# Patient Record
Sex: FEMALE
[De-identification: names, ages, dates, MRNs, and addresses within clinical notes are randomized per-mention and may not be internally consistent; named-entity substitution may affect disease eponyms.]

---

## 2020-06-18 PROBLEM — Z00.00 ENCOUNTER FOR PREVENTIVE HEALTH EXAMINATION: Status: ACTIVE | Noted: 2020-06-18

## 2020-06-18 PROBLEM — Z87.2 HISTORY OF PSORIASIS: Status: RESOLVED | Noted: 2020-06-18 | Resolved: 2020-06-18

## 2020-06-18 PROBLEM — Z78.9 RARELY CONSUMES ALCOHOL: Status: ACTIVE | Noted: 2020-06-18

## 2020-06-18 RX ORDER — ROSUVASTATIN CALCIUM 5 MG/1
TABLET, FILM COATED ORAL
Refills: 0 | Status: ACTIVE | COMMUNITY

## 2020-06-18 RX ORDER — LORAZEPAM 2 MG/1
TABLET ORAL
Refills: 0 | Status: ACTIVE | COMMUNITY

## 2020-06-18 RX ORDER — ZOLPIDEM TARTRATE 5 MG/1
TABLET, FILM COATED ORAL
Refills: 0 | Status: ACTIVE | COMMUNITY

## 2020-06-19 ENCOUNTER — APPOINTMENT (OUTPATIENT)
Dept: NEUROSURGERY | Facility: CLINIC | Age: 45
End: 2020-06-19
Payer: COMMERCIAL

## 2020-06-19 DIAGNOSIS — D33.2 BENIGN NEOPLASM OF BRAIN, UNSPECIFIED: ICD-10-CM

## 2020-06-19 DIAGNOSIS — Z78.9 OTHER SPECIFIED HEALTH STATUS: ICD-10-CM

## 2020-06-19 DIAGNOSIS — Z87.2 PERSONAL HISTORY OF DISEASES OF THE SKIN AND SUBCUTANEOUS TISSUE: ICD-10-CM

## 2020-06-19 PROCEDURE — 99204 OFFICE O/P NEW MOD 45 MIN: CPT | Mod: 95

## 2020-06-19 NOTE — HISTORY OF PRESENT ILLNESS
[FreeTextEntry1] : headache [de-identified] : 45 year old female presented to neuro-optho Dr. Tl Mcdonald for complaints of headaches, lightheadedness when standing and a "whooshing" sound in both ears. This has been going on for about a year intermittently.\par She  denies abnormal growth of hands/feet, breast leaking, abnormal growth of facial features, significant unexplained weight loss/gain, sudden or peripheral vision loss and abnormal hair growth/loss.\par

## 2020-06-19 NOTE — ASSESSMENT
[FreeTextEntry1] : My impression is that the patient suffers from intracranial hypertension .   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  Her KPS is 100. I had a long discussion with the patient regarding the role of ETV.  The patient was extensively educated about the nature of her disease process. We will review her case at our tumor board on Monday. . I have explained the alternatives, risks and benefits to the patient and she understands and agrees to proceed.  This risk of the procedure including but not limited to pain, infection, seizure, stroke, recurrence, residual disease, neurovascular injury, heart attack, pulmonary embolism, blindness, weakness, paralysis and death have been carefully explained to the patient who clearly understands and agrees to proceed.\par

## 2020-06-19 NOTE — DATA REVIEWED
[de-identified] : I have reviewed the most recent MRI 6/12/20 which shows occlusion of the cerebral aqueduct. Ill defined T2 signal change surrounding aqueduct. Mild expansion of lower tectum. Differntial inclused low grade glioma, subependymoma, aqueductal stenosis with interstitial edema. 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 Evidence of bilateral papilledema and low lying tonsils consistent with intracranial hypertension

## 2020-06-19 NOTE — REASON FOR VISIT
[New Patient Visit] : a new patient visit [Referred By: _________] : Patient was referred by SAEID [Family Member] : family member [FreeTextEntry1] : tectal lesion

## 2020-06-22 ENCOUNTER — APPOINTMENT (OUTPATIENT)
Dept: NEUROSURGERY | Facility: CLINIC | Age: 45
End: 2020-06-22
Payer: COMMERCIAL

## 2020-06-22 PROCEDURE — 99214 OFFICE O/P EST MOD 30 MIN: CPT

## 2020-06-22 NOTE — DATA REVIEWED
[de-identified] : I have reviewed the most recent MRI 6/12/20 which shows occlusion of the cerebral aqueduct. Ill defined T2 signal change surrounding aqueduct. Mild expansion of lower tectum. Differntial inclused low grade glioma, subependymoma, aqueductal stenosis with interstitial edema. 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 Evidence of bilateral papilledema and low lying tonsils consistent with intracranial hypertension

## 2020-06-22 NOTE — ASSESSMENT
[FreeTextEntry1] : My impression is that the patient suffers from intracranial hypertension .   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  Her KPS is 100. I had a long discussion with the patient regarding the role of ETV vs  shunt. The consensus from tumor board was to attempt ETV but have a low threshold for  shunt given her basilar anatomy.  The patient was extensively educated about the nature of her disease process.  I have explained the alternatives, risks and benefits to the patient and she understands and agrees to proceed.  This risk of the procedure including but not limited to pain, infection, seizure, stroke, recurrence, residual disease, neurovascular injury, heart attack, pulmonary embolism, blindness, weakness, paralysis and death have been carefully explained to the patient who clearly understands and agrees to proceed.\par

## 2020-06-22 NOTE — HISTORY OF PRESENT ILLNESS
[Home] : at home, [unfilled] , at the time of the visit. [Medical Office: ([facility])___] : at the medical office located in  [FreeTextEntry1] : headache [de-identified] : 45 year old female presented to neuro-optho Dr. Tl Mcdonald for complaints of headaches, lightheadedness when standing and a "whooshing" sound in both ears. 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 Shunt if unable to do ETV due to anatomy

## 2020-07-08 ENCOUNTER — EMERGENCY (EMERGENCY)
Facility: HOSPITAL | Age: 45
LOS: 1 days | Discharge: HOME | End: 2020-07-08
Attending: EMERGENCY MEDICINE | Admitting: EMERGENCY MEDICINE
Payer: COMMERCIAL

## 2020-07-08 VITALS
TEMPERATURE: 98 F | DIASTOLIC BLOOD PRESSURE: 84 MMHG | RESPIRATION RATE: 19 BRPM | HEIGHT: 71 IN | OXYGEN SATURATION: 99 % | HEART RATE: 94 BPM | SYSTOLIC BLOOD PRESSURE: 138 MMHG | WEIGHT: 227.08 LBS

## 2020-07-08 PROCEDURE — 99284 EMERGENCY DEPT VISIT MOD MDM: CPT | Mod: 25

## 2020-07-08 PROCEDURE — 93971 EXTREMITY STUDY: CPT | Mod: 26,LT

## 2020-07-08 PROCEDURE — 99284 EMERGENCY DEPT VISIT MOD MDM: CPT

## 2020-07-08 PROCEDURE — 93971 EXTREMITY STUDY: CPT

## 2020-07-08 NOTE — ED PROVIDER NOTE - NS_ ATTENDINGSCRIBEDETAILS _ED_A_ED_FT
Luciano Graf MD - The scribe's documentation has been prepared under my direction and personally reviewed by me in its entirety. I confirm that the note above accurately reflects all work, treatment, procedures, and medical decision making performed by me.

## 2020-07-08 NOTE — ED ADULT NURSE NOTE - OBJECTIVE STATEMENT
46 yo F w/ recent EVT neurosurgery (x1 week ago) BIBS for atraumatic left lower leg pain. Pt states since yesterday her calf had more swelling than usual and was tight and tender. Denies any major lifting or excessive exercise workout. On visual inspection swelling is slightly noticed in comparison to the right leg, color temp are consistent bilaterally. No edema noted, cap refill <2 sec, +2 pulses, +5 strength.

## 2020-07-08 NOTE — ED PROVIDER NOTE - OBJECTIVE STATEMENT
45 year old female with a PMHx of s/p ETV surgery last week presents to the ED c/o. Pt states that yesterday she woke up with left calf tightness, discomfort stayed constant throughout the day. Pt feels that her left calf seems more discolored and swollen than her right calf. Pt states that she has had intermittent numbness/tingling in her calf since the surgery. No hx of blood clots. PCP: Brian

## 2020-07-08 NOTE — ED PROVIDER NOTE - PATIENT PORTAL LINK FT
You can access the FollowMyHealth Patient Portal offered by [facility] by registering at the following website: [email or web address]. By joining [employer]’s FollowMyHealth portal, you will also be able to view your health information using other applications (apps) compatible with our system.

## 2020-07-08 NOTE — ED PROVIDER NOTE - MUSCULOSKELETAL, MLM
Spine appears normal, range of motion is not limited. + mild posterior calf tenderness on compression, no edema, no increased warmth, no erythema, normal pulses, negative [facility]'s sign

## 2021-06-06 ENCOUNTER — TRANSCRIPTION ENCOUNTER (OUTPATIENT)
Age: 46
End: 2021-06-06
